# Patient Record
Sex: FEMALE | Race: WHITE | NOT HISPANIC OR LATINO
[De-identification: names, ages, dates, MRNs, and addresses within clinical notes are randomized per-mention and may not be internally consistent; named-entity substitution may affect disease eponyms.]

---

## 2019-07-13 ENCOUNTER — TRANSCRIPTION ENCOUNTER (OUTPATIENT)
Age: 49
End: 2019-07-13

## 2019-07-15 ENCOUNTER — RX CHANGE (OUTPATIENT)
Age: 49
End: 2019-07-15

## 2020-01-13 ENCOUNTER — TRANSCRIPTION ENCOUNTER (OUTPATIENT)
Age: 50
End: 2020-01-13

## 2020-01-19 ENCOUNTER — TRANSCRIPTION ENCOUNTER (OUTPATIENT)
Age: 50
End: 2020-01-19

## 2022-06-17 ENCOUNTER — NON-APPOINTMENT (OUTPATIENT)
Age: 52
End: 2022-06-17

## 2022-12-16 ENCOUNTER — NON-APPOINTMENT (OUTPATIENT)
Age: 52
End: 2022-12-16

## 2023-03-01 ENCOUNTER — NON-APPOINTMENT (OUTPATIENT)
Age: 53
End: 2023-03-01

## 2023-04-02 ENCOUNTER — NON-APPOINTMENT (OUTPATIENT)
Age: 53
End: 2023-04-02

## 2023-04-05 ENCOUNTER — APPOINTMENT (OUTPATIENT)
Dept: ORTHOPEDIC SURGERY | Facility: CLINIC | Age: 53
End: 2023-04-05
Payer: MEDICAID

## 2023-04-05 VITALS — BODY MASS INDEX: 36.37 KG/M2 | HEIGHT: 64 IN | WEIGHT: 213 LBS

## 2023-04-05 PROCEDURE — 99203 OFFICE O/P NEW LOW 30 MIN: CPT

## 2023-04-05 NOTE — IMAGING
[de-identified] : RIGHT HAND\par skin intact. mild swelling of RF.\par TTP to RF from base to DIPJ.\par RF: good ext, very limited PIPJ and DIPJ flex.\par good flex/ext other digits. \par SILT to median, ulnar, radial distribution. \par palpable radial pulse, brisk cap refill all digits.\par no triggering.\par \par RF: no instability with RCL/UCL stress of PIPJ and DIPJ.\par \par \par OUTSIDE XRAYS OF RIGHT RING FINGER 4/3/23: no acute displaced fracture or dislocation.

## 2023-04-05 NOTE — ASSESSMENT
[FreeTextEntry1] : The condition was explained to the patient.\par Discussed my concern that hand stiffness can cause grave dysfunction and is difficult to overcome once it has set in. \par - d/c alumafoam splint. can buddy tape finger as needed for comfort.\par - prescribed OT for hand.\par - demonstrated HEP for finger flexion.\par \par F/u 4 weeks.

## 2023-04-05 NOTE — HISTORY OF PRESENT ILLNESS
[Sudden] : sudden [6] : 6 [0] : 0 [Sharp] : sharp [Throbbing] : throbbing [Frequent] : frequent [Nothing helps with pain getting better] : Nothing helps with pain getting better [de-identified] : 4/5/23: 53yo RHD female (not working) presents for RIGHT ring finger pain after fall on 3/2/23. Reports that she sustained multiple injuries from that fall, and has not had prior treatment for the hand. Noticed RIGHT ring finger pain while gripping 2 days after the fall, has been wearing an alumafoam clamshell splint since then.\par Went to [facility] on 4/3/23 due to persistent pain and swelling => XR R ring finger, palced in alumafoam splint.\par \par Hx: asthma. mood disorder. [] : no [FreeTextEntry5] : This is Ms. JOSELITO KHALIL  a 52 year old female who comes in today complaining of right ring finger pain since falling a month ago.  She had other injuries to take care of and focused on that first.   [de-identified] : movement [de-identified] : xray

## 2023-04-05 NOTE — CONSULT LETTER
[Dear  ___] : Dear  [unfilled], [Consult Letter:] : I had the pleasure of evaluating your patient, [unfilled]. [Please see my note below.] : Please see my note below. [Consult Closing:] : Thank you very much for allowing me to participate in the care of this patient.  If you have any questions, please do not hesitate to contact me. [Sincerely,] : Sincerely, [FreeTextEntry3] : Zenaida Segal MD

## 2023-05-24 ENCOUNTER — APPOINTMENT (OUTPATIENT)
Dept: ORTHOPEDIC SURGERY | Facility: CLINIC | Age: 53
End: 2023-05-24
Payer: MEDICAID

## 2023-05-24 VITALS — BODY MASS INDEX: 36.37 KG/M2 | HEIGHT: 64 IN | WEIGHT: 213 LBS

## 2023-05-24 DIAGNOSIS — S63.634A SPRAIN OF INTERPHALANGEAL JOINT OF RIGHT RING FINGER, INITIAL ENCOUNTER: ICD-10-CM

## 2023-05-24 DIAGNOSIS — R20.0 ANESTHESIA OF SKIN: ICD-10-CM

## 2023-05-24 DIAGNOSIS — M25.641 STIFFNESS OF RIGHT HAND, NOT ELSEWHERE CLASSIFIED: ICD-10-CM

## 2023-05-24 PROCEDURE — 99213 OFFICE O/P EST LOW 20 MIN: CPT

## 2023-05-24 PROCEDURE — 73130 X-RAY EXAM OF HAND: CPT | Mod: LT

## 2023-05-24 NOTE — IMAGING
[de-identified] : LEFT HAND\par skin intact. no swelling.\par TTP throughout thumb. TTP diffusely to palm > dorsal hand. TTP diffusely to wrist.\par good elbow extension, flexion. good pronation, supination.\par wrist ROM: mild limited extension, flexion.\par good EPL, FPL. good finger extension, flex to full fist. good finger abduction and adduction. \par numbness of all digits, palm, and dorsal hand.\par palpable radial pulse, brisk cap refill all digits.\par  4/5, 1st DI 5/5, APB 4+/5.\par no triggering.\par + Tinel's at carpal tunnel.\par no ulnar nerve subluxation at the elbow. + Tinel's at cubital tunnel.\par \par \par RIGHT HAND\par skin intact. mild swelling of RF.\par mild TTP to RF PIPJ.\par RF: good ext, composite flex just shy of mid-palm.\par good flex/ext other digits. \par SILT to median, ulnar, radial distribution. \par palpable radial pulse, brisk cap refill all digits.\par no triggering.\par \par \par XRAYS OF LEFT HAND: no acute displaced fracture or dislocation. cyst within scaphoid.

## 2023-05-24 NOTE — HISTORY OF PRESENT ILLNESS
[Sudden] : sudden [0] : 0 [Sharp] : sharp [Throbbing] : throbbing [Frequent] : frequent [Nothing helps with pain getting better] : Nothing helps with pain getting better [9] : 9 [de-identified] : 5/24/23:\par - NEW c/o LEFT hand pain, numbness, tingling for a few weeks. Symptoms are constant, affects entire hand. Denies injury. Wearing comfort cool thumb CMCJ splint during the day and wrist brace for sleep x 1 week. 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 Reports that her foot symptoms may have started even before the bypass surgery, which she had attributed to "restless legs syndrome."\par - f/u RIGHT ring finger PIPJ sprain with hand stiffness. improving with OT 2x/wk @[facility].\par \par 4/5/23: 51yo RHD female (not working) presents for RIGHT ring finger pain after fall on 3/2/23. Reports that she sustained multiple injuries from that fall, and has not had prior treatment for the hand. Noticed RIGHT ring finger pain while gripping 2 days after the fall, has been wearing an alumafoam clamshell splint since then.\par Went to [facility] on 4/3/23 due to persistent pain and swelling => XR R ring finger, placed in alumafoam splint.\par \par Hx: asthma. mood disorder. [] : no [FreeTextEntry1] : R. Ring Finger; Left Hand [FreeTextEntry5] : F/U-R. Hand Ring Finger. Doing better w/ OT. Left hand pain/tingling/numbness started a few weeks ago; severe.  [de-identified] : movement [de-identified] : xray

## 2023-05-24 NOTE — ASSESSMENT
[FreeTextEntry1] : - EDX to evaluate LEFT hand pain/numbness/tingling.\par - continue LEFT wrist brace for sleep.\par - renewed OT for RIGHT hand.\par \par F/u after EDX.\par Given h/o BLE neuropathy, also recommend f/u with Neurologist to evaluate LEFT hand.

## 2023-07-16 ENCOUNTER — NON-APPOINTMENT (OUTPATIENT)
Age: 53
End: 2023-07-16

## 2023-09-14 ENCOUNTER — OFFICE (OUTPATIENT)
Dept: URBAN - METROPOLITAN AREA CLINIC 35 | Facility: CLINIC | Age: 53
Setting detail: OPHTHALMOLOGY
End: 2023-09-14
Payer: COMMERCIAL

## 2023-09-14 DIAGNOSIS — E11.9: ICD-10-CM

## 2023-09-14 PROCEDURE — 92250 FUNDUS PHOTOGRAPHY W/I&R: CPT | Performed by: OPHTHALMOLOGY

## 2023-09-14 PROCEDURE — 92004 COMPRE OPH EXAM NEW PT 1/>: CPT | Performed by: OPHTHALMOLOGY

## 2023-09-14 ASSESSMENT — KERATOMETRY
OS_K1POWER_DIOPTERS: 46.75
OD_K2POWER_DIOPTERS: 47.00
OD_AXISANGLE_DEGREES: 099
OD_K1POWER_DIOPTERS: 46.25
OS_AXISANGLE_DEGREES: 109
OS_K2POWER_DIOPTERS: 47.00

## 2023-09-14 ASSESSMENT — REFRACTION_CURRENTRX
OS_AXIS: 005
OD_OVR_VA: 20/
OD_VPRISM_DIRECTION: SV
OD_AXIS: 178
OS_CYLINDER: +2.25
OS_VPRISM_DIRECTION: SV
OD_CYLINDER: +1.50
OD_SPHERE: +1.75
OS_OVR_VA: 20/
OS_SPHERE: +2.00

## 2023-09-14 ASSESSMENT — REFRACTION_MANIFEST
OS_CYLINDER: +0.75
OD_ADD: +3.00
OD_SPHERE: +1.00
OS_AXIS: 180
OS_ADD: +3.00
OD_AXIS: 180
OD_CYLINDER: +0.75
OS_SPHERE: +1.50

## 2023-09-14 ASSESSMENT — REFRACTION_AUTOREFRACTION
OS_AXIS: 082
OS_SPHERE: +2.50
OD_CYLINDER: -0.50
OD_AXIS: 100
OD_SPHERE: +2.00
OS_CYLINDER: -1.00

## 2023-09-14 ASSESSMENT — TONOMETRY
OS_IOP_MMHG: 17
OD_IOP_MMHG: 17

## 2023-09-14 ASSESSMENT — AXIALLENGTH_DERIVED
OD_AL: 22.0205
OD_AL: 21.8939
OS_AL: 21.7736
OS_AL: 21.7322

## 2023-09-14 ASSESSMENT — CONFRONTATIONAL VISUAL FIELD TEST (CVF)
OS_FINDINGS: FULL
OD_FINDINGS: FULL

## 2023-09-14 ASSESSMENT — SPHEQUIV_DERIVED
OD_SPHEQUIV: 1.75
OS_SPHEQUIV: 2
OS_SPHEQUIV: 1.875
OD_SPHEQUIV: 1.375

## 2023-09-14 ASSESSMENT — VISUAL ACUITY
OD_BCVA: 20/80+1
OS_BCVA: 20/70+1

## 2023-09-28 ENCOUNTER — OFFICE (OUTPATIENT)
Dept: URBAN - METROPOLITAN AREA CLINIC 35 | Facility: CLINIC | Age: 53
Setting detail: OPHTHALMOLOGY
End: 2023-09-28
Payer: COMMERCIAL

## 2023-09-28 DIAGNOSIS — H52.7: ICD-10-CM

## 2023-09-28 DIAGNOSIS — H52.4: ICD-10-CM

## 2023-09-28 DIAGNOSIS — E11.9: ICD-10-CM

## 2023-09-28 PROCEDURE — 99213 OFFICE O/P EST LOW 20 MIN: CPT | Performed by: OPHTHALMOLOGY

## 2023-09-28 PROCEDURE — 92015 DETERMINE REFRACTIVE STATE: CPT | Performed by: OPHTHALMOLOGY

## 2023-09-28 ASSESSMENT — REFRACTION_MANIFEST
OD_ADD: +3.00
OD_CYLINDER: +0.75
OS_CYLINDER: +0.75
OS_SPHERE: +1.50
OS_AXIS: 180
OS_ADD: +3.00
OD_AXIS: 175
OS_AXIS: 180
OS_ADD: +2.75
OD_SPHERE: +1.00
OD_SPHERE: +0.50
OS_CYLINDER: +0.75
OD_ADD: +2.75
OS_SPHERE: +0.75
OD_CYLINDER: +0.50
OD_VA1: 20/20
OS_VA1: 20/25+
OD_AXIS: 180

## 2023-09-28 ASSESSMENT — REFRACTION_CURRENTRX
OS_OVR_VA: 20/
OD_CYLINDER: +0.75
OS_CYLINDER: +1.00
OS_AXIS: 009
OD_VPRISM_DIRECTION: SV
OD_OVR_VA: 20/
OD_SPHERE: +1.50
OD_AXIS: 005
OS_SPHERE: +1.75
OS_VPRISM_DIRECTION: SV

## 2023-09-28 ASSESSMENT — SPHEQUIV_DERIVED
OS_SPHEQUIV: 1.875
OD_SPHEQUIV: 0.75
OS_SPHEQUIV: 1.125
OD_SPHEQUIV: 1
OS_SPHEQUIV: 1.125
OD_SPHEQUIV: 1.375

## 2023-09-28 ASSESSMENT — AXIALLENGTH_DERIVED
OS_AL: 22.0254
OD_AL: 22.3577
OD_AL: 22.2706
OS_AL: 21.7736
OS_AL: 22.0254
OD_AL: 22.1412

## 2023-09-28 ASSESSMENT — VISUAL ACUITY
OD_BCVA: 20/150
OS_BCVA: 20/40

## 2023-09-28 ASSESSMENT — KERATOMETRY
OS_K2POWER_DIOPTERS: 47.25
OD_K2POWER_DIOPTERS: 46.25
OD_K1POWER_DIOPTERS: 46.25
OS_AXISANGLE_DEGREES: 120
OS_K1POWER_DIOPTERS: 46.50
OD_AXISANGLE_DEGREES: 090

## 2023-09-28 ASSESSMENT — REFRACTION_AUTOREFRACTION
OD_SPHERE: +0.75
OD_CYLINDER: +0.50
OD_AXIS: 011
OS_CYLINDER: +1.25
OS_SPHERE: +0.50
OS_AXIS: 166

## 2023-11-20 ENCOUNTER — NON-APPOINTMENT (OUTPATIENT)
Age: 53
End: 2023-11-20

## 2023-11-27 ENCOUNTER — NON-APPOINTMENT (OUTPATIENT)
Age: 53
End: 2023-11-27

## 2024-06-03 ENCOUNTER — NON-APPOINTMENT (OUTPATIENT)
Age: 54
End: 2024-06-03

## 2024-11-08 ENCOUNTER — NON-APPOINTMENT (OUTPATIENT)
Age: 54
End: 2024-11-08

## 2025-01-16 ENCOUNTER — NON-APPOINTMENT (OUTPATIENT)
Age: 55
End: 2025-01-16

## 2025-01-20 ENCOUNTER — NON-APPOINTMENT (OUTPATIENT)
Age: 55
End: 2025-01-20

## 2025-01-22 ENCOUNTER — NON-APPOINTMENT (OUTPATIENT)
Age: 55
End: 2025-01-22

## 2025-01-22 ENCOUNTER — TRANSCRIPTION ENCOUNTER (OUTPATIENT)
Age: 55
End: 2025-01-22

## 2025-01-22 ENCOUNTER — INPATIENT (INPATIENT)
Facility: HOSPITAL | Age: 55
LOS: 0 days | Discharge: HOME | End: 2025-01-23
Attending: HOSPITALIST | Admitting: HOSPITALIST
Payer: MEDICARE

## 2025-01-22 VITALS
TEMPERATURE: 99 F | HEART RATE: 117 BPM | DIASTOLIC BLOOD PRESSURE: 92 MMHG | HEIGHT: 64 IN | OXYGEN SATURATION: 97 % | WEIGHT: 207.9 LBS | SYSTOLIC BLOOD PRESSURE: 152 MMHG | RESPIRATION RATE: 18 BRPM

## 2025-01-22 DIAGNOSIS — Z96.642 PRESENCE OF LEFT ARTIFICIAL HIP JOINT: Chronic | ICD-10-CM

## 2025-01-22 LAB
ALBUMIN SERPL ELPH-MCNC: 3.7 G/DL — SIGNIFICANT CHANGE UP (ref 3.3–5)
ALP SERPL-CCNC: 113 U/L — SIGNIFICANT CHANGE UP (ref 40–120)
ALT FLD-CCNC: 47 U/L — SIGNIFICANT CHANGE UP (ref 12–78)
ANION GAP SERPL CALC-SCNC: 7 MMOL/L — SIGNIFICANT CHANGE UP (ref 5–17)
APPEARANCE UR: CLEAR — SIGNIFICANT CHANGE UP
AST SERPL-CCNC: 24 U/L — SIGNIFICANT CHANGE UP (ref 15–37)
BASOPHILS # BLD AUTO: 0.03 K/UL — SIGNIFICANT CHANGE UP (ref 0–0.2)
BASOPHILS NFR BLD AUTO: 0.4 % — SIGNIFICANT CHANGE UP (ref 0–2)
BILIRUB SERPL-MCNC: 0.3 MG/DL — SIGNIFICANT CHANGE UP (ref 0.2–1.2)
BILIRUB UR-MCNC: NEGATIVE — SIGNIFICANT CHANGE UP
BUN SERPL-MCNC: 13 MG/DL — SIGNIFICANT CHANGE UP (ref 7–23)
CALCIUM SERPL-MCNC: 9.6 MG/DL — SIGNIFICANT CHANGE UP (ref 8.5–10.1)
CHLORIDE SERPL-SCNC: 103 MMOL/L — SIGNIFICANT CHANGE UP (ref 96–108)
CO2 SERPL-SCNC: 29 MMOL/L — SIGNIFICANT CHANGE UP (ref 22–31)
COLOR SPEC: YELLOW — SIGNIFICANT CHANGE UP
CREAT SERPL-MCNC: 0.97 MG/DL — SIGNIFICANT CHANGE UP (ref 0.5–1.3)
DIFF PNL FLD: NEGATIVE — SIGNIFICANT CHANGE UP
EGFR: 69 ML/MIN/1.73M2 — SIGNIFICANT CHANGE UP
EOSINOPHIL # BLD AUTO: 0.01 K/UL — SIGNIFICANT CHANGE UP (ref 0–0.5)
EOSINOPHIL NFR BLD AUTO: 0.1 % — SIGNIFICANT CHANGE UP (ref 0–6)
FLUAV AG NPH QL: DETECTED
FLUBV AG NPH QL: SIGNIFICANT CHANGE UP
GLUCOSE SERPL-MCNC: 286 MG/DL — HIGH (ref 70–99)
GLUCOSE UR QL: >=1000 MG/DL
HCG SERPL-ACNC: 1 MIU/ML — SIGNIFICANT CHANGE UP
HCT VFR BLD CALC: 42 % — SIGNIFICANT CHANGE UP (ref 34.5–45)
HGB BLD-MCNC: 14.6 G/DL — SIGNIFICANT CHANGE UP (ref 11.5–15.5)
IMM GRANULOCYTES NFR BLD AUTO: 0.7 % — SIGNIFICANT CHANGE UP (ref 0–0.9)
KETONES UR-MCNC: 15 MG/DL
LEUKOCYTE ESTERASE UR-ACNC: NEGATIVE — SIGNIFICANT CHANGE UP
LYMPHOCYTES # BLD AUTO: 1.37 K/UL — SIGNIFICANT CHANGE UP (ref 1–3.3)
LYMPHOCYTES # BLD AUTO: 16.8 % — SIGNIFICANT CHANGE UP (ref 13–44)
MAGNESIUM SERPL-MCNC: 1.9 MG/DL — SIGNIFICANT CHANGE UP (ref 1.6–2.6)
MCHC RBC-ENTMCNC: 30.7 PG — SIGNIFICANT CHANGE UP (ref 27–34)
MCHC RBC-ENTMCNC: 34.8 G/DL — SIGNIFICANT CHANGE UP (ref 32–36)
MCV RBC AUTO: 88.4 FL — SIGNIFICANT CHANGE UP (ref 80–100)
MONOCYTES # BLD AUTO: 0.29 K/UL — SIGNIFICANT CHANGE UP (ref 0–0.9)
MONOCYTES NFR BLD AUTO: 3.6 % — SIGNIFICANT CHANGE UP (ref 2–14)
NEUTROPHILS # BLD AUTO: 6.4 K/UL — SIGNIFICANT CHANGE UP (ref 1.8–7.4)
NEUTROPHILS NFR BLD AUTO: 78.4 % — HIGH (ref 43–77)
NITRITE UR-MCNC: NEGATIVE — SIGNIFICANT CHANGE UP
NRBC # BLD: 0 /100 WBCS — SIGNIFICANT CHANGE UP (ref 0–0)
NRBC BLD-RTO: 0 /100 WBCS — SIGNIFICANT CHANGE UP (ref 0–0)
NT-PROBNP SERPL-SCNC: 58 PG/ML — SIGNIFICANT CHANGE UP (ref 0–125)
PH UR: 6 — SIGNIFICANT CHANGE UP (ref 5–8)
PLATELET # BLD AUTO: 316 K/UL — SIGNIFICANT CHANGE UP (ref 150–400)
POTASSIUM SERPL-MCNC: 4 MMOL/L — SIGNIFICANT CHANGE UP (ref 3.5–5.3)
POTASSIUM SERPL-SCNC: 4 MMOL/L — SIGNIFICANT CHANGE UP (ref 3.5–5.3)
PROT SERPL-MCNC: 8 GM/DL — SIGNIFICANT CHANGE UP (ref 6–8.3)
PROT UR-MCNC: NEGATIVE MG/DL — SIGNIFICANT CHANGE UP
RBC # BLD: 4.75 M/UL — SIGNIFICANT CHANGE UP (ref 3.8–5.2)
RBC # FLD: 12.4 % — SIGNIFICANT CHANGE UP (ref 10.3–14.5)
RSV RNA NPH QL NAA+NON-PROBE: SIGNIFICANT CHANGE UP
SARS-COV-2 RNA SPEC QL NAA+PROBE: SIGNIFICANT CHANGE UP
SODIUM SERPL-SCNC: 139 MMOL/L — SIGNIFICANT CHANGE UP (ref 135–145)
SP GR SPEC: >1.03 — HIGH (ref 1–1.03)
TROPONIN I, HIGH SENSITIVITY RESULT: 4.1 NG/L — SIGNIFICANT CHANGE UP
UROBILINOGEN FLD QL: 0.2 MG/DL — SIGNIFICANT CHANGE UP (ref 0.2–1)
WBC # BLD: 8.16 K/UL — SIGNIFICANT CHANGE UP (ref 3.8–10.5)
WBC # FLD AUTO: 8.16 K/UL — SIGNIFICANT CHANGE UP (ref 3.8–10.5)

## 2025-01-22 PROCEDURE — 93010 ELECTROCARDIOGRAM REPORT: CPT

## 2025-01-22 PROCEDURE — 99285 EMERGENCY DEPT VISIT HI MDM: CPT

## 2025-01-22 PROCEDURE — 71046 X-RAY EXAM CHEST 2 VIEWS: CPT | Mod: 26

## 2025-01-22 RX ORDER — MAGNESIUM SULFATE 0.8 MEQ/ML
2 AMPUL (ML) INJECTION ONCE
Refills: 0 | Status: COMPLETED | OUTPATIENT
Start: 2025-01-22 | End: 2025-01-22

## 2025-01-22 RX ORDER — CARBAMAZEPINE 200 MG
1 TABLET ORAL
Refills: 0 | DISCHARGE

## 2025-01-22 RX ORDER — IPRATROPIUM BROMIDE AND ALBUTEROL SULFATE .5; 2.5 MG/3ML; MG/3ML
3 SOLUTION RESPIRATORY (INHALATION)
Refills: 0 | Status: COMPLETED | OUTPATIENT
Start: 2025-01-22 | End: 2025-01-22

## 2025-01-22 RX ORDER — METHYLPREDNISOLONE ACETATE 40 MG/ML
125 VIAL (ML) INJECTION ONCE
Refills: 0 | Status: COMPLETED | OUTPATIENT
Start: 2025-01-22 | End: 2025-01-22

## 2025-01-22 RX ORDER — CEFTRIAXONE 250 MG/1
1000 INJECTION, POWDER, FOR SOLUTION INTRAMUSCULAR; INTRAVENOUS ONCE
Refills: 0 | Status: COMPLETED | OUTPATIENT
Start: 2025-01-22 | End: 2025-01-22

## 2025-01-22 RX ORDER — ACETAMINOPHEN 160 MG/5ML
1000 SUSPENSION ORAL ONCE
Refills: 0 | Status: COMPLETED | OUTPATIENT
Start: 2025-01-22 | End: 2025-01-22

## 2025-01-22 RX ORDER — IPRATROPIUM BROMIDE AND ALBUTEROL SULFATE .5; 2.5 MG/3ML; MG/3ML
3 SOLUTION RESPIRATORY (INHALATION) ONCE
Refills: 0 | Status: COMPLETED | OUTPATIENT
Start: 2025-01-22 | End: 2025-01-22

## 2025-01-22 RX ORDER — QUETIAPINE FUMARATE 300 MG/1
1 TABLET ORAL
Refills: 0 | DISCHARGE

## 2025-01-22 RX ORDER — FLUTICASONE PROPIONATE AND SALMETEROL 113; 14 UG/1; UG/1
1 POWDER, METERED RESPIRATORY (INHALATION)
Refills: 0 | DISCHARGE

## 2025-01-22 RX ORDER — CLONAZEPAM 2 MG
1 TABLET ORAL
Refills: 0 | DISCHARGE

## 2025-01-22 RX ADMIN — IPRATROPIUM BROMIDE AND ALBUTEROL SULFATE 3 MILLILITER(S): .5; 2.5 SOLUTION RESPIRATORY (INHALATION) at 14:46

## 2025-01-22 RX ADMIN — IPRATROPIUM BROMIDE AND ALBUTEROL SULFATE 3 MILLILITER(S): .5; 2.5 SOLUTION RESPIRATORY (INHALATION) at 20:20

## 2025-01-22 RX ADMIN — Medication 125 MILLIGRAM(S): at 14:46

## 2025-01-22 RX ADMIN — CEFTRIAXONE 1000 MILLIGRAM(S): 250 INJECTION, POWDER, FOR SOLUTION INTRAMUSCULAR; INTRAVENOUS at 17:50

## 2025-01-22 RX ADMIN — IPRATROPIUM BROMIDE AND ALBUTEROL SULFATE 3 MILLILITER(S): .5; 2.5 SOLUTION RESPIRATORY (INHALATION) at 15:16

## 2025-01-22 RX ADMIN — ACETAMINOPHEN 1000 MILLIGRAM(S): 160 SUSPENSION ORAL at 15:46

## 2025-01-22 RX ADMIN — Medication 2 GRAM(S): at 15:46

## 2025-01-22 RX ADMIN — IPRATROPIUM BROMIDE AND ALBUTEROL SULFATE 3 MILLILITER(S): .5; 2.5 SOLUTION RESPIRATORY (INHALATION) at 16:49

## 2025-01-22 RX ADMIN — Medication 150 GRAM(S): at 14:46

## 2025-01-22 RX ADMIN — CEFTRIAXONE 100 MILLIGRAM(S): 250 INJECTION, POWDER, FOR SOLUTION INTRAMUSCULAR; INTRAVENOUS at 16:49

## 2025-01-22 RX ADMIN — ACETAMINOPHEN 400 MILLIGRAM(S): 160 SUSPENSION ORAL at 14:46

## 2025-01-22 NOTE — ED PROVIDER NOTE - CLINICAL SUMMARY MEDICAL DECISION MAKING FREE TEXT BOX
54-year-old female pmhx of asthma, recent COVID diagnosis comes to ED w/ shortness of breath, wheezing. Started over the past couple days due to worsening without relief from outpatient management with outpatient PCP patient was told to come to emergency department. Their pain/symptom is moderate to severe, constant, non mediating with rest. Otherwise ROS negative.    General: Uncomfortable in room  HEENT: NCAT, PERRL   Cardiac: RRR, no murmurs, 2+ peripheral pulses   Chest: Pan expiratory wheezing  Abdomen: soft, non-distended, bowel sounds present, no ttp, no rebound or guarding   Extremities: no peripheral edema, calf tenderness, or leg size discrepancies   Skin: no rashes   Neuro: AAOx4, 5+motor, sensory grossly intact   Psych: mood and affect appropriate    Impression: 54-year-old female pmhx of asthma, recent COVID diagnosis comes to ED w/ shortness of breath, wheezing. Their symptoms and exam findings are concerning for asthma exacerbation, worsening from COVID, pneumonia.    Ordered labs, imaging, medications for diagnosis, management, and treatment.

## 2025-01-22 NOTE — ED ADULT NURSE NOTE - NSICDXPASTMEDICALHX_GEN_ALL_CORE_FT
PAST MEDICAL HISTORY:  Anxiety and depression     Asthma     Gastric peptic ulcer     H/O difficult intubation     HTN (hypertension)

## 2025-01-22 NOTE — ED PROVIDER NOTE - PROGRESS NOTE DETAILS
pt contines to wheeze, admitted for further treatment another duoneb ordered  , will reasses pt signed out to me from Dr Plasencia, pt presented with flu like syptoms, cough and wheezing, pt is flu positive, reassessed by him and pt continues to wheeze, plan is for admission

## 2025-01-22 NOTE — ED ADULT NURSE NOTE - NS_SISCREENINGSR_GEN_ALL_ED
To: Ezra Crawford      From: Araceli Lanza RN      Created: 2/10/2018 1:33 PM        [facility],    Due to the acute nature of your symptoms, we do advise you to call the nurse triage line so that we can better help you.  Please call the triage Negative

## 2025-01-22 NOTE — ED ADULT NURSE NOTE - NSFALLRISKINTERV_ED_ALL_ED
Assistance with ambulation/Communicate fall risk and risk factors to all staff, patient, and family/Provide visual cue: yellow wristband, yellow gown, etc/Reinforce activity limits and safety measures with patient and family/Call bell, personal items and telephone in reach/Instruct patient to call for assistance before getting out of bed/chair/stretcher/Non-slip footwear applied when patient is off stretcher/[place] to call system/Physically safe environment - no spills, clutter or unnecessary equipment/Purposeful Proactive Rounding/Room/bathroom lighting operational, light cord in reach

## 2025-01-22 NOTE — ED ADULT NURSE NOTE - OBJECTIVE STATEMENT
53 yo female pw cough, wheezing, sob, fatigue and low back pain x2 days. Pt states tested (+) flu and (+) covid at urgent care earlier today. Pt received duonebs x3 at urgent care and instructed to come to ED. Pmhx asthma, anxiety, depression, HTN, peptic ulcer. Pshx L total hip replacement. Spo2 97% on room air. Tachypneic but able to speak in complete sentences. Cardiac and spo2 monitoring in place.

## 2025-01-22 NOTE — ED ADULT TRIAGE NOTE - CHIEF COMPLAINT QUOTE
pt c/o cough, congestion, shortness breath, fatigue and back pain for a week.  was diagnosed with covid  2 days ago. pt was given 3 nebulizer at urgent this morning with minor relief.  history of asthma.

## 2025-01-23 ENCOUNTER — TRANSCRIPTION ENCOUNTER (OUTPATIENT)
Age: 55
End: 2025-01-23

## 2025-01-23 VITALS — OXYGEN SATURATION: 98 %

## 2025-01-23 LAB
CULTURE RESULTS: SIGNIFICANT CHANGE UP
SPECIMEN SOURCE: SIGNIFICANT CHANGE UP

## 2025-01-23 PROCEDURE — 99232 SBSQ HOSP IP/OBS MODERATE 35: CPT

## 2025-01-23 RX ORDER — CARBAMAZEPINE 200 MG
200 TABLET ORAL DAILY
Refills: 0 | Status: DISCONTINUED | OUTPATIENT
Start: 2025-01-23 | End: 2025-01-23

## 2025-01-23 RX ORDER — QUETIAPINE FUMARATE 300 MG/1
25 TABLET ORAL
Refills: 0 | Status: DISCONTINUED | OUTPATIENT
Start: 2025-01-23 | End: 2025-01-23

## 2025-01-23 RX ORDER — CLONAZEPAM 2 MG
1 TABLET ORAL
Refills: 0 | Status: DISCONTINUED | OUTPATIENT
Start: 2025-01-23 | End: 2025-01-23

## 2025-01-23 RX ORDER — OSELTAMIVIR PHOSPHATE 75 MG/1
1 CAPSULE ORAL
Qty: 10 | Refills: 0
Start: 2025-01-23 | End: 2025-01-27

## 2025-01-23 RX ORDER — QUETIAPINE FUMARATE 300 MG/1
400 TABLET ORAL AT BEDTIME
Refills: 0 | Status: DISCONTINUED | OUTPATIENT
Start: 2025-01-23 | End: 2025-01-23

## 2025-01-23 RX ORDER — ENOXAPARIN SODIUM 100 MG/ML
40 INJECTION SUBCUTANEOUS EVERY 24 HOURS
Refills: 0 | Status: DISCONTINUED | OUTPATIENT
Start: 2025-01-24 | End: 2025-01-23

## 2025-01-23 RX ORDER — ACETAMINOPHEN, DIPHENHYDRAMINE HCL, PHENYLEPHRINE HCL 325; 25; 5 MG/1; MG/1; MG/1
3 TABLET ORAL AT BEDTIME
Refills: 0 | Status: DISCONTINUED | OUTPATIENT
Start: 2025-01-23 | End: 2025-01-23

## 2025-01-23 RX ORDER — MAGNESIUM, ALUMINUM HYDROXIDE 200-225/5
30 SUSPENSION, ORAL (FINAL DOSE FORM) ORAL EVERY 4 HOURS
Refills: 0 | Status: DISCONTINUED | OUTPATIENT
Start: 2025-01-23 | End: 2025-01-23

## 2025-01-23 RX ORDER — IPRATROPIUM BROMIDE AND ALBUTEROL SULFATE .5; 2.5 MG/3ML; MG/3ML
3 SOLUTION RESPIRATORY (INHALATION) EVERY 6 HOURS
Refills: 0 | Status: DISCONTINUED | OUTPATIENT
Start: 2025-01-23 | End: 2025-01-23

## 2025-01-23 RX ORDER — CARBAMAZEPINE 200 MG
1 TABLET ORAL
Refills: 0 | DISCHARGE

## 2025-01-23 RX ORDER — ENOXAPARIN SODIUM 100 MG/ML
40 INJECTION SUBCUTANEOUS EVERY 12 HOURS
Refills: 0 | Status: DISCONTINUED | OUTPATIENT
Start: 2025-01-23 | End: 2025-01-23

## 2025-01-23 RX ORDER — METHYLPREDNISOLONE ACETATE 40 MG/ML
40 VIAL (ML) INJECTION EVERY 6 HOURS
Refills: 0 | Status: DISCONTINUED | OUTPATIENT
Start: 2025-01-23 | End: 2025-01-23

## 2025-01-23 RX ORDER — METHYLPREDNISOLONE ACETATE 40 MG/ML
40 VIAL (ML) INJECTION ONCE
Refills: 0 | Status: COMPLETED | OUTPATIENT
Start: 2025-01-23 | End: 2025-01-23

## 2025-01-23 RX ORDER — OSELTAMIVIR PHOSPHATE 75 MG/1
75 CAPSULE ORAL
Refills: 0 | Status: DISCONTINUED | OUTPATIENT
Start: 2025-01-23 | End: 2025-01-23

## 2025-01-23 RX ORDER — PREDNISONE 5 MG/1
2 TABLET ORAL
Qty: 14 | Refills: 0
Start: 2025-01-23 | End: 2025-01-29

## 2025-01-23 RX ORDER — CARBAMAZEPINE 200 MG
400 TABLET ORAL AT BEDTIME
Refills: 0 | Status: DISCONTINUED | OUTPATIENT
Start: 2025-01-23 | End: 2025-01-23

## 2025-01-23 RX ORDER — IPRATROPIUM BROMIDE AND ALBUTEROL SULFATE .5; 2.5 MG/3ML; MG/3ML
3 SOLUTION RESPIRATORY (INHALATION)
Qty: 360 | Refills: 0
Start: 2025-01-23 | End: 2025-02-21

## 2025-01-23 RX ORDER — DULOXETINE 20 MG/1
60 CAPSULE, DELAYED RELEASE ORAL
Refills: 0 | DISCHARGE

## 2025-01-23 RX ORDER — DULOXETINE 20 MG/1
60 CAPSULE, DELAYED RELEASE ORAL AT BEDTIME
Refills: 0 | Status: DISCONTINUED | OUTPATIENT
Start: 2025-01-23 | End: 2025-01-23

## 2025-01-23 RX ORDER — PREDNISONE 5 MG/1
40 TABLET ORAL DAILY
Refills: 0 | Status: DISCONTINUED | OUTPATIENT
Start: 2025-01-24 | End: 2025-01-23

## 2025-01-23 RX ORDER — ACETAMINOPHEN 160 MG/5ML
650 SUSPENSION ORAL EVERY 6 HOURS
Refills: 0 | Status: DISCONTINUED | OUTPATIENT
Start: 2025-01-23 | End: 2025-01-23

## 2025-01-23 RX ORDER — ONDANSETRON 4 MG/1
4 TABLET, ORALLY DISINTEGRATING ORAL EVERY 8 HOURS
Refills: 0 | Status: DISCONTINUED | OUTPATIENT
Start: 2025-01-23 | End: 2025-01-23

## 2025-01-23 RX ADMIN — QUETIAPINE FUMARATE 25 MILLIGRAM(S): 300 TABLET ORAL at 07:55

## 2025-01-23 RX ADMIN — Medication 1 MILLIGRAM(S): at 07:55

## 2025-01-23 RX ADMIN — Medication 200 MILLIGRAM(S): at 06:21

## 2025-01-23 RX ADMIN — IPRATROPIUM BROMIDE AND ALBUTEROL SULFATE 3 MILLILITER(S): .5; 2.5 SOLUTION RESPIRATORY (INHALATION) at 17:07

## 2025-01-23 RX ADMIN — ENOXAPARIN SODIUM 40 MILLIGRAM(S): 100 INJECTION SUBCUTANEOUS at 06:23

## 2025-01-23 RX ADMIN — Medication 40 MILLIGRAM(S): at 06:22

## 2025-01-23 RX ADMIN — IPRATROPIUM BROMIDE AND ALBUTEROL SULFATE 3 MILLILITER(S): .5; 2.5 SOLUTION RESPIRATORY (INHALATION) at 06:24

## 2025-01-23 RX ADMIN — IPRATROPIUM BROMIDE AND ALBUTEROL SULFATE 3 MILLILITER(S): .5; 2.5 SOLUTION RESPIRATORY (INHALATION) at 11:15

## 2025-01-23 RX ADMIN — Medication 40 MILLIGRAM(S): at 17:45

## 2025-01-23 RX ADMIN — OSELTAMIVIR PHOSPHATE 75 MILLIGRAM(S): 75 CAPSULE ORAL at 12:35

## 2025-01-23 RX ADMIN — QUETIAPINE FUMARATE 25 MILLIGRAM(S): 300 TABLET ORAL at 14:10

## 2025-01-23 RX ADMIN — Medication 1 MILLIGRAM(S): at 14:09

## 2025-01-23 NOTE — PROGRESS NOTE ADULT - SUBJECTIVE AND OBJECTIVE BOX
HPI:  54-year-old female with PMH of asthma and bipolar disorder presents to the ED with a 1-week history of cough and wheezing. Patient states she has been experiencing cough, SOB, and wheezing for about one week now. She has been using nebulizer treatments at home and her rescue inhaler with minimal improvement. She went to urgent care one week ago and tested positive for Covid-19. Patient also reports loss of appetite and diaphoresis. She denies chest pain, nausea, vomiting, fever. Patient reports  as sick contact.    ED course:    VS: tachycardic with HR , hemodynamically stable otherwise  Labs: glucose 286, influenza A positive  CXR: negative    In the ED, patient received duo nebs x 4, Mg sulf 2 mg, solumedrol 125 mg  (23 Jan 2025 01:59)  Patient is a 54y old  Female who presents with a chief complaint of Asthma exacerbation (23 Jan 2025 01:59)     continues to complain of shortness of breath   INTERVAL HPI/OVERNIGHT EVENTS:    MEDICATIONS  (STANDING):  albuterol/ipratropium for Nebulization 3 milliLiter(s) Nebulizer every 6 hours  carBAMazepine 200 milliGRAM(s) Oral daily  carBAMazepine 400 milliGRAM(s) Oral at bedtime  clonazePAM  Tablet 1 milliGRAM(s) Oral <User Schedule>  DULoxetine 60 milliGRAM(s) Oral at bedtime  oseltamivir 75 milliGRAM(s) Oral <User Schedule>  QUEtiapine 400 milliGRAM(s) Oral at bedtime  QUEtiapine 25 milliGRAM(s) Oral <User Schedule>    MEDICATIONS  (PRN):  acetaminophen     Tablet .. 650 milliGRAM(s) Oral every 6 hours PRN Temp greater or equal to 38C (100.4F), Mild Pain (1 - 3)  aluminum hydroxide/magnesium hydroxide/simethicone Suspension 30 milliLiter(s) Oral every 4 hours PRN Dyspepsia  melatonin 3 milliGRAM(s) Oral at bedtime PRN Insomnia  ondansetron Injectable 4 milliGRAM(s) IV Push every 8 hours PRN Nausea and/or Vomiting      Allergies    Bee stings (Hives; Pruritus)  dairy products (Hives; Pruritus)  penicillins (Hives)  sulfa drugs (Hives)    Intolerances    Tolerated ceftriaxone 1/2025 (Other)      REVIEW OF SYSTEMS:  CONSTITUTIONAL: No fever, weight loss, or fatigue  EYES: No eye pain, visual disturbances, or discharge  ENMT:  No difficulty hearing, tinnitus, vertigo; No sinus or throat pain  NECK: No pain or stiffness  BREASTS: No pain, masses, or nipple discharge  RESPIRATORY: No cough, wheezing, chills or hemoptysis; No shortness of breath  CARDIOVASCULAR: No chest pain, palpitations, dizziness, or leg swelling  GASTROINTESTINAL: No abdominal or epigastric pain. No nausea, vomiting, or hematemesis; No diarrhea or constipation. No melena or hematochezia.  GENITOURINARY: No dysuria, frequency, hematuria, or incontinence  NEUROLOGICAL: No headaches, memory loss, loss of strength, numbness, or tremors  SKIN: No itching, burning, rashes, or lesions   LYMPH NODES: No enlarged glands  ENDOCRINE: No heat or cold intolerance; No hair loss  MUSCULOSKELETAL: No joint pain or swelling; No muscle, back, or extremity pain  PSYCHIATRIC: No depression, anxiety, mood swings, or difficulty sleeping  HEME/LYMPH: No easy bruising, or bleeding gums  ALLERGY AND IMMUNOLOGIC: No hives or eczema    Vital Signs Last 24 Hrs  T(C): 36.6 (23 Jan 2025 09:05), Max: 36.9 (22 Jan 2025 19:38)  T(F): 97.9 (23 Jan 2025 09:05), Max: 98.4 (22 Jan 2025 19:38)  HR: 84 (23 Jan 2025 11:44) (84 - 99)  BP: 157/99 (23 Jan 2025 09:05) (145/101 - 165/99)  BP(mean): 115 (22 Jan 2025 19:38) (115 - 115)  RR: 18 (23 Jan 2025 09:05) (14 - 19)  SpO2: 98% (23 Jan 2025 11:44) (95% - 100%)    Parameters below as of 23 Jan 2025 11:44  Patient On (Oxygen Delivery Method): room air        PHYSICAL EXAM:  GENERAL: NAD, well-groomed, well-developed  HEAD:  Atraumatic, Normocephalic  EYES: EOMI, PERRLA, conjunctiva and sclera clear  ENMT: No tonsillar erythema, exudates, or enlargement; Moist mucous membranes, Good dentition, No lesions  NECK: Supple, No JVD, Normal thyroid  NERVOUS SYSTEM:  Alert & Oriented X3, Good concentration; Motor Strength 5/5 B/L upper and lower extremities; DTRs 2+ intact and symmetric  CHEST/LUNG: Clear to ascultation  bilaterally; No rales, rhonchi, wheezing, or rubs  HEART: Regular rate and rhythm; No murmurs, rubs, or gallops  ABDOMEN: Soft, Nontender, Nondistended; Bowel sounds present  EXTREMITIES:  2+ Peripheral Pulses, No clubbing, cyanosis, or edema  LYMPH: No lymphadenopathy noted  SKIN: No rashes or lesions    LABS:                        14.6   8.16  )-----------( 316      ( 22 Jan 2025 15:09 )             42.0     01-22    139  |  103  |  13  ----------------------------<  286[H]  4.0   |  29  |  0.97    Ca    9.6      22 Jan 2025 15:09  Mg     1.9     01-22    TPro  8.0  /  Alb  3.7  /  TBili  0.3  /  DBili  x   /  AST  24  /  ALT  47  /  AlkPhos  113  01-22      Urinalysis Basic - ( 22 Jan 2025 19:27 )    Color: Yellow / Appearance: Clear / SG: >1.030 / pH: x  Gluc: x / Ketone: 15 mg/dL  / Bili: Negative / Urobili: 0.2 mg/dL   Blood: x / Protein: Negative mg/dL / Nitrite: Negative   Leuk Esterase: Negative / RBC: x / WBC x   Sq Epi: x / Non Sq Epi: x / Bacteria: x      CAPILLARY BLOOD GLUCOSE          RADIOLOGY & ADDITIONAL TESTS:    Imaging Personally Reviewed:  [ ] YES  [ ] NO    Consultant(s) Notes Reviewed:  [ ] YES  [ ] NO    Care Discussed with Consultants/Other Providers [ ] YES  [ ] NO

## 2025-01-23 NOTE — DISCHARGE NOTE PROVIDER - NSDCMRMEDTOKEN_GEN_ALL_CORE_FT
Advair Diskus 250 mcg-50 mcg inhalation powder: 1 inhaled 2 times a day  carBAMazepine 200 mg oral tablet: 1 tab(s) orally 2 times a day  carBAMazepine 400 mg oral tablet, extended release: 1 tab(s) orally once a day (at bedtime)  DULoxetine: 60 milligram(s) orally once a day (at bedtime)  ipratropium-albuterol 0.5 mg-2.5 mg/3 mL inhalation solution: 3 milliliter(s) inhaled every 6 hours  KlonoPIN 1 mg oral tablet: 1 tab(s) orally 2 times a day  oseltamivir 75 mg oral capsule: 1 cap(s) orally 2 times a day  predniSONE 20 mg oral tablet: 2 tab(s) orally once a day  Seroquel 25 mg oral tablet: 1 tab(s) orally 2 times a day 8am and 2pm  Seroquel 400 mg oral tablet: 1 tab(s) orally once a day (at bedtime)

## 2025-01-23 NOTE — H&P ADULT - HISTORY OF PRESENT ILLNESS
54-year-old female with PMH of asthma and bipolar disorder presents to the ED with a 1-week history of cough and wheezing. Patient states she has been experiencing cough, SOB, and wheezing for about one week now. She has been using nebulizer treatments at home and her rescue inhaler with minimal improvement. She went to urgent care one week ago and tested positive for Covid-19. Patient also reports loss of appetite and diaphoresis. She denies chest pain, nausea, vomiting, fever. Patient reports  as sick contact.    ED course:    VS: tachycardic with HR , hemodynamically stable otherwise  Labs: glucose 286, influenza A positive  CXR: negative    In the ED, patient received duo nebs x 4, Mg sulf 2 mg, solumedrol 125 mg

## 2025-01-23 NOTE — DISCHARGE NOTE PROVIDER - NSFOLLOWUPCLINICS_GEN_ALL_ED_FT
[facility] [place] Pulmonolgy and [facility] Medicine  Pulmonology  [address] [place] [address], [place] [address]  Phone: [phone]  Fax:

## 2025-01-23 NOTE — H&P ADULT - ASSESSMENT
Med list acquired from patient.      54-year-old female with PMH of asthma and bipolar disorder presents to the ED with a 1-week history of cough and wheezing, admitted for acute asthma exacerbation     #Asthma exacerbation  Patient presents with SOB, cough, and wheezing. Received  duo nebs x 4, Mg sulf 2 mg, solumedrol 125 mg.   - IV solumedrol 40 q 6  - Duo Nebs     Chronic medical conditions:  Bipolar disorder/Anxiety: Continue carbamazepine, clonazepam, duloxetine, and seroquel    Med list acquired from patient.   DVT PPx: Lovenox

## 2025-01-23 NOTE — DISCHARGE NOTE NURSING/CASE MANAGEMENT/SOCIAL WORK - NSDCPEFALRISK_GEN_ALL_CORE
For information on Fall & Injury Prevention, visit: [email or web address] OR  [email or web address] OR  https://www.cdc.gov/steadi/patient.html

## 2025-01-23 NOTE — DISCHARGE NOTE PROVIDER - NSDCFUADDAPPT_GEN_ALL_CORE_FT
APPTS ARE READY TO BE MADE: [ ] YES    Best Family or Patient Contact (if needed):    Additional Information about above appointments (if needed):    1: Pulmonary Home Program   2:   3:     Other comments or requests:

## 2025-01-23 NOTE — PATIENT PROFILE ADULT - FALL HARM RISK - UNIVERSAL INTERVENTIONS
Bed in lowest position, wheels locked, appropriate side rails in place/Call bell, personal items and telephone in reach/Instruct patient to call for assistance before getting out of bed or chair/Non-slip footwear when patient is out of bed/[place] to call system/Physically safe environment - no spills, clutter or unnecessary equipment/Purposeful Proactive Rounding/Room/bathroom lighting operational, light cord in reach

## 2025-01-23 NOTE — ED ADULT NURSE REASSESSMENT NOTE - NS ED NURSE REASSESS COMMENT FT1
Report received from RN at this time. Assessment available on KB. will continue to monitor. Pt with clear lung sounds bilaterally, still with nonproductive cough and Pt expresses improved SOB. Pt safety maintained.

## 2025-01-23 NOTE — DISCHARGE NOTE NURSING/CASE MANAGEMENT/SOCIAL WORK - PATIENT PORTAL LINK FT
You can access the FollowMyHealth Patient Portal offered by [facility] by registering at the following website: [email or web address]. By joining [employer]’s FollowMyHealth portal, you will also be able to view your health information using other applications (apps) compatible with our system.

## 2025-01-23 NOTE — DISCHARGE NOTE NURSING/CASE MANAGEMENT/SOCIAL WORK - FINANCIAL ASSISTANCE
[facility] provides services at a reduced cost to those who are determined to be eligible through [facility]’s financial assistance program. Information regarding [facility]’s financial assistance program can be found by going to [email or web address] or by calling [phone].

## 2025-01-23 NOTE — PHARMACOTHERAPY INTERVENTION NOTE - COMMENTS
Recommended enoxaparin frequency adjustment to q24 hours since documented BMI is below 40.
Recommended switching methylprednisolone IV to prednisone 40 mg PO daily for asthma exacerbation. Order placed for another 3 days to complete 5 day total treatment course.
Modified allergy header to state patient received/tolerated ceftriaxone during this admission with no documented reaction.

## 2025-01-23 NOTE — H&P ADULT - NSHPLABSRESULTS_GEN_ALL_CORE
.  LABS:                         14.6   8.16  )-----------( 316      ( 22 Jan 2025 15:09 )             42.0     01-22    139  |  103  |  13  ----------------------------<  286[H]  4.0   |  29  |  0.97    Ca    9.6      22 Jan 2025 15:09  Mg     1.9     01-22    TPro  8.0  /  Alb  3.7  /  TBili  0.3  /  DBili  x   /  AST  24  /  ALT  47  /  AlkPhos  113  01-22      Urinalysis Basic - ( 22 Jan 2025 19:27 )    Color: Yellow / Appearance: Clear / SG: >1.030 / pH: x  Gluc: x / Ketone: 15 mg/dL  / Bili: Negative / Urobili: 0.2 mg/dL   Blood: x / Protein: Negative mg/dL / Nitrite: Negative   Leuk Esterase: Negative / RBC: x / WBC x   Sq Epi: x / Non Sq Epi: x / Bacteria: x            RADIOLOGY, EKG & ADDITIONAL TESTS: Reviewed.

## 2025-01-23 NOTE — DISCHARGE NOTE PROVIDER - HOSPITAL COURSE
HPI:  54-year-old female with PMH of asthma and bipolar disorder presents to the ED with a 1-week history of cough and wheezing. Patient states she has been experiencing cough, SOB, and wheezing for about one week now. She has been using nebulizer treatments at home and her rescue inhaler with minimal improvement. She went to urgent care one week ago and tested positive for Covid-19. Patient also reports loss of appetite and diaphoresis. She denies chest pain, nausea, vomiting, fever. Patient reports  as sick contact.    ED course:    VS: tachycardic with HR , hemodynamically stable otherwise  Labs: glucose 286, influenza A positive  CXR: negative    In the ED, patient received duo nebs x 4, Mg sulf 2 mg, solumedrol 125 mg  (23 Jan 2025 01:59)      Patient improved with Nebulizers and steroids   Patients ASTHMA  was exacerbated ny being Flu Positive   patient was discharged home with tamiflu and breathing without requireng supplemental O2

## 2025-01-23 NOTE — PROGRESS NOTE ADULT - ASSESSMENT
54-year-old female with PMH of asthma and bipolar disorder presents to the ED with a 1-week history of cough and wheezing, admitted for acute asthma exacerbation     #Asthma exacerbation  Patient presents with SOB, cough, and wheezing. Received  duo nebs x 4, Mg sulf 2 mg, solumedrol 125 mg.   - IV solumedrol 40 q 6 now starting on PO Prednisone in DIEGO   - Duo Nebs     Chronic medical conditions:  Bipolar disorder/Anxiety: Continue carbamazepine, clonazepam, duloxetine, and seroquel    Med list acquired from patient.   DVT PPx: Lovenox

## 2025-01-23 NOTE — PATIENT PROFILE ADULT - FUNCTIONAL SCREEN CURRENT LEVEL: SWALLOWING (IF SCORE 2 OR MORE FOR ANY ITEM, CONSULT REHAB SERVICES), MLM)
Airway  Date/Time: 1/9/2024 7:40 AM  Urgency: elective    Airway not difficult    General Information and Staff    Patient location during procedure: OR  Anesthesiologist: Sebas Reynolds MD  Resident/CRNA: David Shaffer CRNA  Performed: CRNA   Performed by: David Shaffer CRNA  Authorized by: Sebas Reynolds MD      Indications and Patient Condition  Indications for airway management: anesthesia  Spontaneous Ventilation: absent  Sedation level: deep  Preoxygenated: yes  Patient position: sniffing  Mask difficulty assessment: 2 - vent by mask + OA or adjuvant +/- NMBA    Final Airway Details  Final airway type: endotracheal airway      Successful airway: ETT  Cuffed: yes   Successful intubation technique: direct laryngoscopy  Facilitating devices/methods: intubating stylet  Endotracheal tube insertion site: oral  Blade size: #3  ETT size (mm): 7.0    Cormack-Lehane Classification: grade IIA - partial view of glottis  Placement verified by: capnometry   Cuff volume (mL): 6  Measured from: lips  ETT to lips (cm): 21  Number of attempts at approach: 1  Number of other approaches attempted: 0    Additional Comments  Dentition per pre op      
0 = swallows foods/liquids without difficulty

## 2025-01-23 NOTE — DISCHARGE NOTE NURSING/CASE MANAGEMENT/SOCIAL WORK - NSDCPETBCESMAN_GEN_ALL_CORE
If you are a smoker, it is important for your health to stop smoking. Please be aware that second hand smoke is also harmful.
Patient refused

## 2025-01-23 NOTE — H&P ADULT - NSHPPHYSICALEXAM_GEN_ALL_CORE
Vital Signs Last 24 Hrs  T(C): 36.7 (23 Jan 2025 04:32), Max: 37.1 (22 Jan 2025 12:48)  T(F): 98.1 (23 Jan 2025 04:32), Max: 98.8 (22 Jan 2025 12:48)  HR: 92 (23 Jan 2025 04:32) (92 - 117)  BP: 165/99 (23 Jan 2025 04:32) (145/101 - 165/99)  BP(mean): 115 (22 Jan 2025 19:38) (115 - 115)  RR: 14 (23 Jan 2025 04:32) (14 - 19)  SpO2: 95% (23 Jan 2025 04:32) (95% - 97%)    Parameters below as of 23 Jan 2025 04:32  Patient On (Oxygen Delivery Method): room air    GENERAL: NAD, lying in bed comfortably  HEAD:  Atraumatic, normocephalic  EYES: EOMI, PERRL  NECK: Supple, trachea midline, no JVD  HEART: Regular rate and rhythm  LUNGS: Unlabored respirations.  Mild wheezing. No crackles or rhonchi  ABDOMEN: Soft, nontender, nondistended, +BS  EXTREMITIES: 2+ peripheral pulses bilaterally. No clubbing, cyanosis, or edema  NERVOUS SYSTEM:  A&Ox3, moving all extremities, no focal deficits

## 2025-01-23 NOTE — DISCHARGE NOTE PROVIDER - NSDCCPCAREPLAN_GEN_ALL_CORE_FT
PRINCIPAL DISCHARGE DIAGNOSIS  Diagnosis: Asthma exacerbation  Assessment and Plan of Treatment:       SECONDARY DISCHARGE DIAGNOSES  Diagnosis: Influenza  Assessment and Plan of Treatment:

## 2025-01-28 LAB
CULTURE RESULTS: SIGNIFICANT CHANGE UP
CULTURE RESULTS: SIGNIFICANT CHANGE UP
SPECIMEN SOURCE: SIGNIFICANT CHANGE UP
SPECIMEN SOURCE: SIGNIFICANT CHANGE UP

## 2025-01-30 DIAGNOSIS — J45.901 UNSPECIFIED ASTHMA WITH (ACUTE) EXACERBATION: ICD-10-CM

## 2025-01-30 DIAGNOSIS — J10.1 INFLUENZA DUE TO OTHER IDENTIFIED INFLUENZA VIRUS WITH OTHER RESPIRATORY MANIFESTATIONS: ICD-10-CM

## 2025-01-30 DIAGNOSIS — Z86.16 PERSONAL HISTORY OF COVID-19: ICD-10-CM

## 2025-01-30 DIAGNOSIS — Z88.1 ALLERGY STATUS TO OTHER ANTIBIOTIC AGENTS: ICD-10-CM

## 2025-01-30 DIAGNOSIS — Z91.011 ALLERGY TO MILK PRODUCTS: ICD-10-CM

## 2025-01-30 DIAGNOSIS — F31.9 BIPOLAR DISORDER, UNSPECIFIED: ICD-10-CM

## 2025-01-30 DIAGNOSIS — Z79.51 LONG TERM (CURRENT) USE OF INHALED STEROIDS: ICD-10-CM

## 2025-01-30 DIAGNOSIS — I10 ESSENTIAL (PRIMARY) HYPERTENSION: ICD-10-CM

## 2025-01-30 DIAGNOSIS — F41.9 ANXIETY DISORDER, UNSPECIFIED: ICD-10-CM

## 2025-01-30 DIAGNOSIS — Z88.2 ALLERGY STATUS TO SULFONAMIDES: ICD-10-CM

## 2025-01-30 DIAGNOSIS — Z96.642 PRESENCE OF LEFT ARTIFICIAL HIP JOINT: ICD-10-CM

## 2025-03-14 PROBLEM — K25.9 GASTRIC ULCER, UNSPECIFIED AS ACUTE OR CHRONIC, WITHOUT HEMORRHAGE OR PERFORATION: Chronic | Status: ACTIVE | Noted: 2025-01-22

## 2025-03-14 PROBLEM — Z91.89 OTHER SPECIFIED PERSONAL RISK FACTORS, NOT ELSEWHERE CLASSIFIED: Chronic | Status: ACTIVE | Noted: 2025-01-22

## 2025-03-14 PROBLEM — J45.909 UNSPECIFIED ASTHMA, UNCOMPLICATED: Chronic | Status: ACTIVE | Noted: 2025-01-22

## 2025-03-14 PROBLEM — I10 ESSENTIAL (PRIMARY) HYPERTENSION: Chronic | Status: ACTIVE | Noted: 2025-01-22

## 2025-03-22 ENCOUNTER — APPOINTMENT (OUTPATIENT)
Dept: ULTRASOUND IMAGING | Facility: CLINIC | Age: 55
End: 2025-03-22

## 2025-03-22 ENCOUNTER — OUTPATIENT (OUTPATIENT)
Dept: OUTPATIENT SERVICES | Facility: HOSPITAL | Age: 55
LOS: 1 days | End: 2025-03-22
Payer: MEDICARE

## 2025-03-22 DIAGNOSIS — Z96.642 PRESENCE OF LEFT ARTIFICIAL HIP JOINT: Chronic | ICD-10-CM

## 2025-03-22 DIAGNOSIS — Z00.00 ENCOUNTER FOR GENERAL ADULT MEDICAL EXAMINATION WITHOUT ABNORMAL FINDINGS: ICD-10-CM

## 2025-03-22 PROCEDURE — 76700 US EXAM ABDOM COMPLETE: CPT

## 2025-03-22 PROCEDURE — 76700 US EXAM ABDOM COMPLETE: CPT | Mod: 26

## 2025-04-02 ENCOUNTER — OUTPATIENT (OUTPATIENT)
Dept: OUTPATIENT SERVICES | Facility: HOSPITAL | Age: 55
LOS: 1 days | End: 2025-04-02
Payer: MEDICARE

## 2025-04-02 ENCOUNTER — APPOINTMENT (OUTPATIENT)
Dept: CT IMAGING | Facility: CLINIC | Age: 55
End: 2025-04-02
Payer: MEDICARE

## 2025-04-02 DIAGNOSIS — Z96.642 PRESENCE OF LEFT ARTIFICIAL HIP JOINT: Chronic | ICD-10-CM

## 2025-04-02 PROCEDURE — 74177 CT ABD & PELVIS W/CONTRAST: CPT | Mod: 26

## 2025-04-02 PROCEDURE — 74177 CT ABD & PELVIS W/CONTRAST: CPT | Mod: MH

## 2025-07-12 ENCOUNTER — NON-APPOINTMENT (OUTPATIENT)
Age: 55
End: 2025-07-12